# Patient Record
Sex: MALE | Race: WHITE | URBAN - METROPOLITAN AREA
[De-identification: names, ages, dates, MRNs, and addresses within clinical notes are randomized per-mention and may not be internally consistent; named-entity substitution may affect disease eponyms.]

---

## 2024-03-08 ENCOUNTER — OFFICE VISIT (OUTPATIENT)
Dept: FAMILY MEDICINE CLINIC | Age: 54
End: 2024-03-08
Payer: COMMERCIAL

## 2024-03-08 VITALS
DIASTOLIC BLOOD PRESSURE: 84 MMHG | OXYGEN SATURATION: 96 % | HEIGHT: 74 IN | TEMPERATURE: 97.1 F | WEIGHT: 215.6 LBS | SYSTOLIC BLOOD PRESSURE: 122 MMHG | HEART RATE: 75 BPM | BODY MASS INDEX: 27.67 KG/M2

## 2024-03-08 DIAGNOSIS — Z00.00 ANNUAL PHYSICAL EXAM: Primary | ICD-10-CM

## 2024-03-08 DIAGNOSIS — E78.2 MIXED HYPERLIPIDEMIA: ICD-10-CM

## 2024-03-08 PROCEDURE — 99386 PREV VISIT NEW AGE 40-64: CPT | Performed by: FAMILY MEDICINE

## 2024-03-08 ASSESSMENT — PATIENT HEALTH QUESTIONNAIRE - PHQ9
SUM OF ALL RESPONSES TO PHQ9 QUESTIONS 1 & 2: 0
SUM OF ALL RESPONSES TO PHQ QUESTIONS 1-9: 0
2. FEELING DOWN, DEPRESSED OR HOPELESS: 0
SUM OF ALL RESPONSES TO PHQ QUESTIONS 1-9: 0
1. LITTLE INTEREST OR PLEASURE IN DOING THINGS: 0

## 2024-03-08 NOTE — PROGRESS NOTES
Henry Wagner is a 54 y.o. Male who came into the clinic today to establish care with me and for appropriate   management.  Since I am seeing the patient for the first time today I did review in detail his medical history.    The patient has hyperlipidemia which she has been trying to manage with diet and exercise.  The patient had   some blood work done in [place] recently and he will send those results for me to review.  Will advise accordingly.    The patient also had screening colonoscopy recently and as per the patient he was advised to follow-up in   5 years.  Will review those results and will advise accordingly.  The patient gives family history of multiple polyps   in his father but no history of any colon cancer.  Otherwise today did not have any other questions or concerns   and all the question and concerns were appropriately answered.    History reviewed. No pertinent past medical history.    Patient Active Problem List   Diagnosis    Mixed hyperlipidemia       Past Surgical History:   Procedure Laterality Date    APPENDECTOMY  1979    CHOLECYSTECTOMY      KNEE ARTHROSCOPY Left     KNEE ARTHROSCOPY Right     NASAL SEPTUM SURGERY         Family History   Problem Relation Age of Onset    High Cholesterol Mother     High Blood Pressure Father        Social History     Tobacco Use    Smoking status: Never     Passive exposure: Never    Smokeless tobacco: Never   Substance Use Topics    Alcohol use: Yes     Alcohol/week: 2.0 standard drinks of alcohol     Types: 2 Cans of beer per week     Comment: social       No current outpatient medications on file prior to visit.     No current facility-administered medications on file prior to visit.       No Known Allergies    REVIEW OF SYSTEMS:   CONSTITUTIONAL: No weight loss, fever, chills, weakness or fatigue.   HEENT: Eyes: No visual loss, blurred vision, double vision or yellow sclerae.   Ears, Nose, Throat: No hearing loss, sneezing,